# Patient Record
Sex: FEMALE | Race: WHITE | NOT HISPANIC OR LATINO | URBAN - METROPOLITAN AREA
[De-identification: names, ages, dates, MRNs, and addresses within clinical notes are randomized per-mention and may not be internally consistent; named-entity substitution may affect disease eponyms.]

---

## 2023-11-21 NOTE — PROGRESS NOTES
History of Present Illness   48 year old female with good quality skin with some mild early facial aging presenting for evaluation of botox injections.     Procedure  Discussion:  -I discussed medication, onset, duration, and risks/benefits including bleeding/bruising, infection, asymmetry, and spread - upper eyelid ptosis  -Patient wishes to proceed  -Quote provided and payment received  -Consent signed     Procedure:  -Skin cleaned with alcohol prep skin marked   -Injected: 14 U total to the forehead, 16 U to the glabella and 20 U to the lateral eyes  -After-care information sheet provided.  -f/u PRN     Box ID: [id number]  Lot #: [id number]  Exp. date: 2026/05    The patient is aware that if any changes occur in the interval to call and return for follow-up evaluation. The patient states understanding and is in agreement with this plan. Follow-up PRN.

## 2023-12-14 ENCOUNTER — OFFICE VISIT (OUTPATIENT)
Dept: PLASTIC SURGERY | Facility: CLINIC | Age: 48
End: 2023-12-14
Payer: COMMERCIAL

## 2023-12-14 DIAGNOSIS — L98.8 AGE-RELATED FACIAL WRINKLES: Primary | ICD-10-CM

## 2023-12-14 PROCEDURE — 99212 OFFICE O/P EST SF 10 MIN: CPT

## 2024-09-20 ENCOUNTER — OFFICE VISIT (OUTPATIENT)
Dept: URGENT CARE | Age: 49
End: 2024-09-20
Payer: COMMERCIAL

## 2024-09-20 VITALS
WEIGHT: 149 LBS | OXYGEN SATURATION: 97 % | RESPIRATION RATE: 16 BRPM | HEART RATE: 69 BPM | DIASTOLIC BLOOD PRESSURE: 68 MMHG | TEMPERATURE: 98.3 F | BODY MASS INDEX: 23.34 KG/M2 | SYSTOLIC BLOOD PRESSURE: 97 MMHG

## 2024-09-20 DIAGNOSIS — R30.0 DYSURIA: Primary | ICD-10-CM

## 2024-09-20 DIAGNOSIS — N30.01 ACUTE CYSTITIS WITH HEMATURIA: ICD-10-CM

## 2024-09-20 LAB
POC BILIRUBIN, URINE: ABNORMAL
POC BLOOD, URINE: NEGATIVE
POC GLUCOSE, URINE: NEGATIVE MG/DL
POC KETONES, URINE: NEGATIVE MG/DL
POC LEUKOCYTES, URINE: ABNORMAL
POC NITRITE,URINE: POSITIVE
POC PH, URINE: 6.5 PH
POC PROTEIN, URINE: NEGATIVE MG/DL
POC SPECIFIC GRAVITY, URINE: 1.01
POC UROBILINOGEN, URINE: 0.2 EU/DL
PREGNANCY TEST URINE, POC: NEGATIVE

## 2024-09-20 PROCEDURE — 87086 URINE CULTURE/COLONY COUNT: CPT

## 2024-09-20 RX ORDER — CEPHALEXIN 500 MG/1
500 CAPSULE ORAL 2 TIMES DAILY
Qty: 14 CAPSULE | Refills: 0 | Status: SHIPPED | OUTPATIENT
Start: 2024-09-20 | End: 2024-09-27

## 2024-09-20 NOTE — PROGRESS NOTES
Subjective   Patient ID: Ness Madden is a 49 y.o. female. They present today with a chief complaint of Difficulty Urinating (C/O BURNING WITH URINATION X 1 MONTH. YESTERDAY NAUSEA AND DIZZY BILAT BACK PAIN).    History of Present Illness  HPI  Patient is a 49-year-old female who presents with burning with urination and pressure.  She states she has also had a little bit of dizziness and nausea starting yesterday.  Patient did take Cipro but did not finish her full dose about a month ago and her symptoms came back.  She denies any fevers.  Past Medical History  Allergies as of 09/20/2024    (No Known Allergies)       (Not in a hospital admission)         No past medical history on file.    No past surgical history on file.         Review of Systems  Review of Systems  Gen: No fatigue, fever, sweats.  Head: No headache, trauma.  Eyes: No vision loss, double vision, drainage, eye pain.  ENT: No hearing changes, pain, epistaxis, congestion  Cardiac: No chest pain  Pulmonary: No shortness of breath,  pleuritic pain,   Heme/lymph: No swollen glands  GI: No abdominal pain, nausea, vomiting, diarrhea  : + dysuria, +frequency, urgency, hematuria  Musculoskeletal: No limb pain, joint pain, back pain, joint swelling or stiffness.  Skin: No rashes, pruritus, lumps, lesions.  Neuro: No Numbness, tingling, or weakness.  Psych: No  anxiety     Review of systems is otherwise negative unless stated above or in history of present illness.                             Objective    Vitals:    09/20/24 0823   BP: 97/68   BP Location: Left arm   Patient Position: Sitting   BP Cuff Size: Adult   Pulse: 69   Resp: 16   Temp: 36.8 °C (98.3 °F)   TempSrc: Oral   SpO2: 97%   Weight: 67.6 kg (149 lb)     No LMP recorded.    Physical Exam  General: Vital signs stable, Pt is alert, no acute distress  Eyes: Conjunctiva normal, PERRL, EOMs intact  HENMT: Normocephalic, atraumatic, external ears and nose normal, no scars or masses.  No  mastoid tenderness. Trachea is midline. No meningeal signs, negative Kernig and Brudzinski, moves neck freely.  No sinus tenderness  Resp: Respiratory effort is normal, no retractions, no stridor. Lungs CTA, no wheezes or rhonchi  CV: Heart is regular rate and rhythm.   Skin: No evidence of trauma, skin is warm and dry. No rashes, lesions or ulcers.  Skel: full range of motion of upper and lower extremities.   Neuro: Normal gait, CN II-XII intact, no motor or sensory changes.  Psych: Alert and oriented ×3, judgment is appropriate, normal mood and affect     Procedures    Point of Care Test & Imaging Results from this visit  Results for orders placed or performed in visit on 06/27/18   Celiac Panel   Result Value Ref Range    Tissue Transglutaminase, IgA <1 0 - 14    Tissue Transglutamase IgG <1 0 - 14    DEAMIDATED GLIADIN PEPTIDE IGA 18 (H) 0 - 14    DEAMIDATED GLIADIN PEPTIDE IGG 1 0 - 14     No results found.    Diagnostic study results (if any) were reviewed by [facility].    Assessment/Plan   Allergies, medications, history, and pertinent labs/EKGs/Imaging reviewed by Rosy Aquino, APRN-CNP.     Medical Decision Making  History and physical, Pt appears to have an uncomplicated UTI based on history and exam. No signs of pyelonephritis, sepsis, systemic illness, or vaginitis. Pt will be treated with antibiotics and contacted if urine culture demonstrates resistance to antibiotic selected for treatment. Follow up and return precautions discussed with patient prior to discharge.      Orders and Diagnoses  Diagnoses and all orders for this visit:  Dysuria  -     POCT pregnancy, urine manually resulted  -     POCT UA Automated manually resulted        Medical Admin Record      Follow Up Instructions  No follow-ups on file.Your urine was sent to the lab for culture and sensitivity. You will be notified if your antibiotic needs to be changed based on sensitivity results. Take medications as directed. Take  with food, yogurt, or a probiotic. I recommend taking a probiotic while taking this medication, and for 7 days after you finish it.    A probiotic is a supplement you can buy over-the-counter that helps support the good bacteria in your body while taking antibiotics. Probiotics help to avoid the side effects of antibiotics, such as diarrhea or yeast infections. It is best to take a probiotic about 2 hours after your dose of antibiotic. Drink plenty of fluids, or sugar-free cranberry juice. Follow-up with primary care doctor in 3-5 days if symptoms persist. If for severe or worsening symptoms, please go to the ER    Patient disposition:     Electronically signed by [facility]  8:33 AM

## 2024-09-20 NOTE — PATIENT INSTRUCTIONS
Your urine was sent to the lab for culture and sensitivity. You will be notified if your antibiotic needs to be changed based on sensitivity results. Take medications as directed. Take with food, yogurt, or a probiotic. I recommend taking a probiotic while taking this medication, and for 7 days after you finish it.    A probiotic is a supplement you can buy over-the-counter that helps support the good bacteria in your body while taking antibiotics. Probiotics help to avoid the side effects of antibiotics, such as diarrhea or yeast infections. It is best to take a probiotic about 2 hours after your dose of antibiotic. Drink plenty of fluids, or sugar-free cranberry juice. Follow-up with primary care doctor in 3-5 days if symptoms persist. If for severe or worsening symptoms, please go to the ER

## 2024-09-22 LAB — BACTERIA UR CULT: ABNORMAL

## 2024-09-23 LAB — BACTERIA UR CULT: ABNORMAL

## 2025-03-24 ENCOUNTER — APPOINTMENT (OUTPATIENT)
Dept: PRIMARY CARE | Facility: CLINIC | Age: 50
End: 2025-03-24
Payer: COMMERCIAL